# Patient Record
Sex: FEMALE
[De-identification: names, ages, dates, MRNs, and addresses within clinical notes are randomized per-mention and may not be internally consistent; named-entity substitution may affect disease eponyms.]

---

## 2024-04-26 PROBLEM — Z00.00 ENCOUNTER FOR PREVENTIVE HEALTH EXAMINATION: Status: ACTIVE | Noted: 2024-04-26

## 2024-05-02 PROBLEM — M25.812 MASS OF JOINT OF LEFT SHOULDER: Status: ACTIVE | Noted: 2024-05-02

## 2024-05-03 ENCOUNTER — APPOINTMENT (OUTPATIENT)
Dept: ORTHOPEDIC SURGERY | Facility: CLINIC | Age: 54
End: 2024-05-03
Payer: COMMERCIAL

## 2024-05-03 VITALS
SYSTOLIC BLOOD PRESSURE: 113 MMHG | OXYGEN SATURATION: 98 % | HEART RATE: 88 BPM | HEIGHT: 65 IN | DIASTOLIC BLOOD PRESSURE: 75 MMHG | WEIGHT: 200 LBS | BODY MASS INDEX: 33.32 KG/M2

## 2024-05-03 DIAGNOSIS — G54.0 BRACHIAL PLEXUS DISORDERS: ICD-10-CM

## 2024-05-03 DIAGNOSIS — M25.812 OTHER SPECIFIED JOINT DISORDERS, LEFT SHOULDER: ICD-10-CM

## 2024-05-03 PROCEDURE — 99205 OFFICE O/P NEW HI 60 MIN: CPT

## 2024-05-03 PROCEDURE — 73030 X-RAY EXAM OF SHOULDER: CPT | Mod: LT

## 2024-05-06 NOTE — DATA REVIEWED
[Imaging Present] : Present [de-identified] : Xryays today, muiltiple views of the left shoulder Show a soft tissue lucency under the deltoid along the lateral and posterior portion of the proximal humerus.  The rest of the bones look intact.  There is no significant erosion.  MRI scan March 28, 2024 of the left shoulder shows:  IMPRESSION:  MRI of the left shoulder demonstrates: There is a large lipomatous lesion with thin septations identified in the soft tissues lateral to the proximal humerus. This measures 7.5 cm in the craniocaudal dimension by 4.3 cm in transverse dimension by 5.0 cm in AP dimension. Because of its prominent size presence of septations orthopedic oncologic consultation suggested. This could reflect atypical lipoma (favored) but low grade liposarcoma is not excluded.  Partial tear of the posterior superior labrum. Linear fissure in the cartilage of the posterior superior glenoid.  Rotator cuff tendinosis.  Subacromial/subdeltoid bursitis.

## 2024-05-06 NOTE — REVIEW OF SYSTEMS
[Joint Stiffness] : joint stiffness [Joint Swelling] : joint swelling [Feeling Tired] : not feeling tired [Joint Pain] : no joint pain
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
## 2024-05-06 NOTE — PHYSICAL EXAM
[General Appearance - Well-Appearing] : Well appearing [General Appearance - Well Nourished] : well nourished [Oriented To Time, Place, And Person] : Oriented to person, place, and time [Sclera] : the sclera and conjunctiva were normal [Neck Cervical Mass (___cm)] : no neck mass was observed [Heart Rate And Rhythm] : heart rate was normal and rhythm regular [] : No respiratory distress [Abdomen Soft] : Soft [Normal Station and Gait] : gait and station were normal [Swelling] : swelling [Masses] : masses [Full UE ROM unless otherwise noted:] : Full range of motion unless otherwise noted. [UE Motor Strength Normal unless otherwise noted:] : 5/5 strength in bilateral upper extremities unless otherwise noted. [Normal] : Sensation intact to light touch. [FreeTextEntry1] : On exam the patient stands in good balance.  She does have some obvious swelling over the lateral side of her shoulder on the left compared to the right.  She gets up to 170 degrees of abduction on the right and only 150 on the left.  She has good internal and external rotation with minimal pain.  She has normal sensation throughout.  She has good strength laterally In the deltoid.  She has no lymphadenopathy. [Skin Changes - Describe changes:] : No skin changes noted

## 2024-05-06 NOTE — DISCUSSION/SUMMARY
[Surgical risks reviewed] : Surgical risks reviewed [All Questions Answered] : Patient (and family) had all questions answered to an agreeable level of satisfaction [Interested in Proceeding] : Patient (and family) expressed understanding and interest in proceeding with the plan as outlined [de-identified] : Patient has a large lipomatous mass in the left shoulder.  The axillary nerve with posterior humeral circumflex artery right and right over the mass and are being stretched out by the mass laterally.  It also shows a tail of the fatty mass going towards the axilla posteromedially.  Regardless I would do a posterolateral type approach and expose the artery and nerve and try and mobilize the fat around it possibly even bisecting it if necessary.  She understands there is a risk of numbness as well as some weakness in the shoulder.  I have discussed with the patient the spectrum of fat containing lesions ranging  from small simple lipoma is to atypical lipomatous tumors to low-grade malignancy is to full de-differentiated liposarcomas. We discussed how atypical lipomatous tumors and to have a chance of recurrence that is higher than regular lipomas. We discussed that when recurring they also have a small chance of coming back as a malignancy. We discussed that the terminology of low-grade sarcoma vs. atypical lipomatous tumor has to do with the location of the lesion as well as its aggressiveness. Atypical lipomatous tumor is diagnosed mostly based on its genetic characteristics.  Will plan for surgery in the near future.  With her history of seizures she is get any neurologic as well as medical clearance.  We discussed smoking cessation as well.   If imaging or pathology/biopsy was ordered, the patient was told to make an appointment to review findings right after all imaging is completed.  We discussed risks, benefits and alternatives. Rationale of care was reviewed and all questions were answered. Patient (and family) had all questions answered to her degree of the level of satisfaction. Patient (and family) expressed understanding and interest in proceeding with the plan as outlined.     This note was done with a voice recognition transcription software and any typos are related to this rather than medical error. Surgical risks reviewed. Patient (and family) had all questions answered to an agreeable level of satisfaction. Patient (and family) expressed understanding and interest in proceeding with the plan as outlined.

## 2024-05-30 ENCOUNTER — TRANSCRIPTION ENCOUNTER (OUTPATIENT)
Age: 54
End: 2024-05-30

## 2024-05-30 VITALS
HEIGHT: 64 IN | WEIGHT: 203.05 LBS | HEART RATE: 68 BPM | DIASTOLIC BLOOD PRESSURE: 83 MMHG | TEMPERATURE: 98 F | SYSTOLIC BLOOD PRESSURE: 135 MMHG | RESPIRATION RATE: 16 BRPM | OXYGEN SATURATION: 98 %

## 2024-05-30 NOTE — ASU PATIENT PROFILE, ADULT - FALL HARM RISK - RISK INTERVENTIONS

## 2024-05-30 NOTE — ASU PATIENT PROFILE, ADULT - INTERNATIONAL TRAVEL
Daily Note     Today's date: 2019  Patient name: Ailyn Haji  : 1975  MRN: [id number]  Referring provider: Carly Monreal MD  Dx:   Encounter Diagnosis     ICD-10-CM    1  Acute pain of left shoulder M25 512                   Subjective: Patient reports she has most soreness in the arm, more than the shoulder  Objective: See treatment diary below  Patient reaches PROM limitations in all planes except FF  Assessment: Tolerated treatment well  Patient exhibited good technique with therapeutic exercises      Plan: Progress treament per protocol  Precautions: None     Daily Treatment Diary      Manual         PROM                               Exercise Diary                Pendulums 10x TID  20x  20x  20X       Scapular retractions 10x TID  20x  20x  20X       Elbow, forearm, wrist, and hand AROM 10x TID  20x  20x  20X       Pulleys      10x each  10ea       Supine flex PROM with R arm A     10x  10X       Wand ER   20x  20x  20X       Wand Flexion               Wand Abduction               Wand IR               Table Slides               Wall Slides               UBE: S               T-Band High Row               T-Band Mid Row               T-Band Low Row               T-Band Biceps               T-Band Triceps               T-Band SA Pulldowns               T-Band IR               T-Band ER               Forward/Lateral Raises               Hoist Row: S                Lat Pulldown:  S               XO Biceps               XO Triceps               XO Upright Row               XO IR               XO ER               XO SA Pulldown               Blackburns               Ball Stabilization               Bodyblade                               Modalities                CP 13'              CP/IFC    15'  15'  15'        
No

## 2024-05-30 NOTE — ASU PATIENT PROFILE, ADULT - NSICDXPASTSURGICALHX_GEN_ALL_CORE_FT
PAST SURGICAL HISTORY:  H/O: hysterectomy 2000    History of cholecystectomy     History of hernia repair
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
## 2024-05-30 NOTE — ASU PREOP CHECKLIST - SURGICAL CONSENT
LINKS immunization registry, Care Everywhere and Health Maintenance updated.  Chart reviewed for overdue Proactive [facility] Encounters health maintenance testing.   done

## 2024-05-31 ENCOUNTER — OUTPATIENT (OUTPATIENT)
Dept: OUTPATIENT SERVICES | Facility: HOSPITAL | Age: 54
LOS: 1 days | Discharge: HOME | End: 2024-05-31
Payer: COMMERCIAL

## 2024-05-31 ENCOUNTER — RESULT REVIEW (OUTPATIENT)
Age: 54
End: 2024-05-31

## 2024-05-31 ENCOUNTER — TRANSCRIPTION ENCOUNTER (OUTPATIENT)
Age: 54
End: 2024-05-31

## 2024-05-31 ENCOUNTER — APPOINTMENT (OUTPATIENT)
Dept: ORTHOPEDIC SURGERY | Facility: HOSPITAL | Age: 54
End: 2024-05-31

## 2024-05-31 VITALS
RESPIRATION RATE: 20 BRPM | DIASTOLIC BLOOD PRESSURE: 66 MMHG | OXYGEN SATURATION: 94 % | SYSTOLIC BLOOD PRESSURE: 117 MMHG | HEART RATE: 76 BPM

## 2024-05-31 DIAGNOSIS — Z90.49 ACQUIRED ABSENCE OF OTHER SPECIFIED PARTS OF DIGESTIVE TRACT: Chronic | ICD-10-CM

## 2024-05-31 DIAGNOSIS — Z98.890 OTHER SPECIFIED POSTPROCEDURAL STATES: Chronic | ICD-10-CM

## 2024-05-31 DIAGNOSIS — Z90.710 ACQUIRED ABSENCE OF BOTH CERVIX AND UTERUS: Chronic | ICD-10-CM

## 2024-05-31 LAB
GLUCOSE BLDC GLUCOMTR-MCNC: 119 MG/DL — HIGH (ref 70–99)
GLUCOSE BLDC GLUCOMTR-MCNC: 139 MG/DL — HIGH (ref 70–99)

## 2024-05-31 PROCEDURE — 23078 RAD RESCJ TUM SHOULDER 5CM/>: CPT | Mod: LT

## 2024-05-31 PROCEDURE — 82962 GLUCOSE BLOOD TEST: CPT

## 2024-05-31 PROCEDURE — 94640 AIRWAY INHALATION TREATMENT: CPT

## 2024-05-31 PROCEDURE — 88304 TISSUE EXAM BY PATHOLOGIST: CPT

## 2024-05-31 PROCEDURE — C9399: CPT

## 2024-05-31 PROCEDURE — 88304 TISSUE EXAM BY PATHOLOGIST: CPT | Mod: 26

## 2024-05-31 RX ORDER — ACETAMINOPHEN 500 MG
1000 TABLET ORAL EVERY 6 HOURS
Refills: 0 | Status: DISCONTINUED | OUTPATIENT
Start: 2024-05-31 | End: 2024-05-31

## 2024-05-31 RX ORDER — HYDROCORTISONE 20 MG
200 TABLET ORAL ONCE
Refills: 0 | Status: COMPLETED | OUTPATIENT
Start: 2024-05-31 | End: 2024-05-31

## 2024-05-31 RX ORDER — HYDROMORPHONE HYDROCHLORIDE 2 MG/ML
0.5 INJECTION INTRAMUSCULAR; INTRAVENOUS; SUBCUTANEOUS
Refills: 0 | Status: DISCONTINUED | OUTPATIENT
Start: 2024-05-31 | End: 2024-05-31

## 2024-05-31 RX ORDER — OXYCODONE AND ACETAMINOPHEN 5; 325 MG/1; MG/1
1 TABLET ORAL
Qty: 16 | Refills: 0
Start: 2024-05-31 | End: 2024-06-03

## 2024-05-31 RX ORDER — AMLODIPINE BESYLATE 2.5 MG/1
1 TABLET ORAL
Refills: 0 | DISCHARGE

## 2024-05-31 RX ORDER — SENNA PLUS 8.6 MG/1
1 TABLET ORAL
Qty: 9 | Refills: 0
Start: 2024-05-31 | End: 2024-06-08

## 2024-05-31 RX ORDER — OXYCODONE HYDROCHLORIDE 5 MG/1
10 TABLET ORAL EVERY 4 HOURS
Refills: 0 | Status: DISCONTINUED | OUTPATIENT
Start: 2024-05-31 | End: 2024-05-31

## 2024-05-31 RX ORDER — METFORMIN HYDROCHLORIDE 850 MG/1
1 TABLET ORAL
Refills: 0 | DISCHARGE

## 2024-05-31 RX ORDER — IPRATROPIUM/ALBUTEROL SULFATE 18-103MCG
3 AEROSOL WITH ADAPTER (GRAM) INHALATION ONCE
Refills: 0 | Status: COMPLETED | OUTPATIENT
Start: 2024-05-31 | End: 2024-05-31

## 2024-05-31 RX ORDER — SODIUM CHLORIDE 9 MG/ML
1000 INJECTION, SOLUTION INTRAVENOUS
Refills: 0 | Status: DISCONTINUED | OUTPATIENT
Start: 2024-05-31 | End: 2024-05-31

## 2024-05-31 RX ORDER — OXYCODONE HYDROCHLORIDE 5 MG/1
5 TABLET ORAL EVERY 4 HOURS
Refills: 0 | Status: DISCONTINUED | OUTPATIENT
Start: 2024-05-31 | End: 2024-05-31

## 2024-05-31 RX ORDER — CEFAZOLIN SODIUM 1 G
2000 VIAL (EA) INJECTION EVERY 8 HOURS
Refills: 0 | Status: COMPLETED | OUTPATIENT
Start: 2024-05-31 | End: 2024-05-31

## 2024-05-31 RX ORDER — LAMOTRIGINE 25 MG/1
1 TABLET, ORALLY DISINTEGRATING ORAL
Refills: 0 | DISCHARGE

## 2024-05-31 RX ORDER — BUDESONIDE AND FORMOTEROL FUMARATE DIHYDRATE 160; 4.5 UG/1; UG/1
2 AEROSOL RESPIRATORY (INHALATION)
Refills: 0 | DISCHARGE

## 2024-05-31 RX ORDER — ATORVASTATIN CALCIUM 80 MG/1
1 TABLET, FILM COATED ORAL
Refills: 0 | DISCHARGE

## 2024-05-31 RX ORDER — BENZOCAINE AND MENTHOL 5; 1 G/100ML; G/100ML
1 LIQUID ORAL ONCE
Refills: 0 | Status: COMPLETED | OUTPATIENT
Start: 2024-05-31 | End: 2024-05-31

## 2024-05-31 RX ADMIN — Medication 1000 MILLIGRAM(S): at 10:19

## 2024-05-31 RX ADMIN — HYDROMORPHONE HYDROCHLORIDE 0.5 MILLIGRAM(S): 2 INJECTION INTRAMUSCULAR; INTRAVENOUS; SUBCUTANEOUS at 10:10

## 2024-05-31 RX ADMIN — Medication 3 MILLILITER(S): at 12:00

## 2024-05-31 RX ADMIN — Medication 3 MILLILITER(S): at 10:40

## 2024-05-31 RX ADMIN — Medication 200 MILLIGRAM(S): at 11:45

## 2024-05-31 RX ADMIN — HYDROMORPHONE HYDROCHLORIDE 0.5 MILLIGRAM(S): 2 INJECTION INTRAMUSCULAR; INTRAVENOUS; SUBCUTANEOUS at 10:25

## 2024-05-31 RX ADMIN — SODIUM CHLORIDE 100 MILLILITER(S): 9 INJECTION, SOLUTION INTRAVENOUS at 10:12

## 2024-05-31 RX ADMIN — Medication 1000 MILLIGRAM(S): at 10:49

## 2024-05-31 RX ADMIN — BENZOCAINE AND MENTHOL 1 LOZENGE: 5; 1 LIQUID ORAL at 10:40

## 2024-05-31 NOTE — CHART NOTE - NSCHARTNOTEFT_GEN_A_CORE
Notified by RN patient c/o asthma exacerbation.   Seen sitting up in stretcher. Discussed symptoms, states it feels like when she has an asthma exacerbation at home for which she sees a pulmonologist.  Per patient, she took her inhaler this morning as usual. She reports she recently went to pulmonologist who told her to take "steroids and azithromycin".  Chart reviewed, noted she was seen by PMD for asthma exacerbation a few weeks ago. Reports she felt better after course of steroid course.  On exam, respirations appear equal, symmetrical, NAD. Bilateral lung sounds with expiratory wheeze.  Dr. Bowden came to bedside, decided to give duoneb treatment.  Given patient wheeze unchanged after treatment, will give solucortef pe Dr. Bowden.  Patient informed of above by Dr. Bowden, RN aware.  Currently sleeping in chair.  Dispo likely home when PACU criteria met/symptoms of asthma improved.

## 2024-05-31 NOTE — ASU DISCHARGE PLAN (ADULT/PEDIATRIC) - NS MD DC FALL RISK RISK
For information on Fall & Injury Prevention, visit: [email or web address] OR  [email or web address] OR  https://www.cdc.gov/steadi/patient.html

## 2024-05-31 NOTE — ASU DISCHARGE PLAN (ADULT/PEDIATRIC) - ASU DC SPECIAL INSTRUCTIONSFT
Change dressing as needed if saturated, otherwise keep until follow up with Dr. Su Leave dressing for 72 hrs, then may remove and leave open to air. Do not apply any creams, lotions etc.

## 2024-05-31 NOTE — PRE-ANESTHESIA EVALUATION ADULT - NSDENTALSD_ENT_ALL_CORE
[facility]  Department of Pulmonary, Critical Care and Sleep Medicine  [address] [address] [facility]  Department of Internal Medicine  Progress Note    SUBJECTIVE:    On gtt   Still with edema  He is still quite short of breath with exertion. OBJECTIVE:  Vitals:    03/02/22 0941 03/02/22 1246 03/02/22 1336 03/02/22 1630   BP:    101/73   Pulse:    98   Resp:    18   Temp:    97.4 °F (36.3 °C)   TempSrc:    Oral   SpO2: 92% 95%     Weight:       Height:   5' 9\" (1.753 m)      Constitutional: Alert,     EENT: EOMI YOSEF. MMM. No icterus. No thrush. Neck: No thyromegaly. No JVD on my exam.  Respiratory: Breath sounds are diminished without use of accessory muscles. Cardiovascular: Regular,      Pulses:  Equal bilaterally. Abdomen: Soft without organomegaly. Lymphatic: No lymphadenopathy. Musculoskeletal: Without weakness or gross deficits  Extremities: 3+ edema bilaterally. L>>R Feet still quite tight  Skin:  Warm and dry. No skin rashes. Neurological/Psychiatric: No acute psychosis. Cranial nerves are intact. DATA:    Monitor Strips:  Reviewed & discusses with technical team. No changes noted.     RADIOLOGY:  Films were read/reviewed/discussed with radiology shows effusion and heart failure    CBC with Differential:    Lab Results   Component Value Date    WBC 10.7 03/02/2022    RBC 4.03 03/02/2022    HGB 11.5 03/02/2022    HCT 36.2 03/02/2022     03/02/2022    MCV 89.8 03/02/2022    MCH 28.5 03/02/2022    MCHC 31.8 03/02/2022    RDW 16.5 03/02/2022    LYMPHOPCT 19.9 02/23/2022    MONOPCT 11.6 02/23/2022    BASOPCT 0.3 02/23/2022    MONOSABS 1.10 02/23/2022    LYMPHSABS 1.89 02/23/2022    EOSABS 0.30 02/23/2022    BASOSABS 0.03 02/23/2022     CMP:    Lab Results   Component Value Date     03/02/2022    K 3.2 03/02/2022    K 4.1 02/23/2022    CL 93 03/02/2022    CO2 34 03/02/2022    BUN 49 03/02/2022    CREATININE 2.7 03/02/2022    GFRAA appears normal and intact

## 2024-05-31 NOTE — DISCHARGE NOTE NURSING/CASE MANAGEMENT/SOCIAL WORK - NSDCPEFALRISK_GEN_ALL_CORE
For information on Fall & Injury Prevention, visit: [email or web address] OR  [email or web address] OR  https://www.cdc.gov/steadi/patient.html

## 2024-05-31 NOTE — DISCHARGE NOTE NURSING/CASE MANAGEMENT/SOCIAL WORK - PATIENT PORTAL LINK FT
You can access the FollowMyHealth Patient Portal offered by [facility] by registering at the following website: [email or web address]. By joining [facility]’s FollowMyHealth portal, you will also be able to view your health information using other applications (apps) compatible with our system.

## 2024-05-31 NOTE — ASU DISCHARGE PLAN (ADULT/PEDIATRIC) - CARE PROVIDER_API CALL
Rebel Su  Orthopaedic Surgery  [address], [address]  [place] [address]  Phone: [phone]  Fax: [phone]  Established Patient  Follow Up Time: 2 weeks

## 2024-06-03 PROBLEM — E78.5 HYPERLIPIDEMIA, UNSPECIFIED: Chronic | Status: ACTIVE | Noted: 2024-05-30

## 2024-06-03 PROBLEM — E11.9 TYPE 2 DIABETES MELLITUS WITHOUT COMPLICATIONS: Chronic | Status: ACTIVE | Noted: 2024-05-30

## 2024-06-03 PROBLEM — J45.909 UNSPECIFIED ASTHMA, UNCOMPLICATED: Chronic | Status: ACTIVE | Noted: 2024-05-30

## 2024-06-03 PROBLEM — I10 ESSENTIAL (PRIMARY) HYPERTENSION: Chronic | Status: ACTIVE | Noted: 2024-05-30
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
## 2024-06-03 PROBLEM — Z87.898 PERSONAL HISTORY OF OTHER SPECIFIED CONDITIONS: Chronic | Status: ACTIVE | Noted: 2024-05-30

## 2024-06-05 LAB — SURGICAL PATHOLOGY STUDY: SIGNIFICANT CHANGE UP

## 2024-06-14 ENCOUNTER — APPOINTMENT (OUTPATIENT)
Dept: ORTHOPEDIC SURGERY | Facility: CLINIC | Age: 54
End: 2024-06-14
Payer: COMMERCIAL

## 2024-06-14 DIAGNOSIS — D17.22 BENIGN LIPOMATOUS NEOPLASM OF SKIN AND SUBCUTANEOUS TISSUE OF LEFT ARM: ICD-10-CM

## 2024-06-14 PROCEDURE — 99024 POSTOP FOLLOW-UP VISIT: CPT

## 2024-06-14 NOTE — HISTORY OF PRESENT ILLNESS
[___ Weeks Post Op] : [unfilled] weeks post op [2] : the patient reports pain that is 2/10 in severity [Chills] : no chills [Fever] : no fever [Doing Well] : is doing well [Excellent Pain Control] : has excellent pain control [No Sign of Infection] : is showing no signs of infection [de-identified] : 5/31/2024 -resection of left shoulder mass [de-identified] : Patient is doing well this point.  She has no complaints.  She still has some soreness in the deltoid but is able to move everything otherwise. [de-identified] : On exam incision is clean dry and intact.  She has no paresthesias in the area.  She is able to raise her left arm to 140 degrees compared to 170 on the other side but she says it is mostly because of soreness. 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 [de-identified] : 2 weeks postop from a colon resection doing well.  She has no obvious neurologic problems with her axillary nerve.  She is moving appropriately although she has some soreness.  She has no numbness around the area of the shoulder.  My recommendation is to continue following her to make sure she gets back to regular activity otherwise we can send her for physical therapy.  She is confident she can get back to her own activity. [de-identified] : Follow-up again as needed should she have poor motion.  If imaging or pathology/biopsy was ordered, the patient was told to make an appointment to review findings right after all imaging is completed.  We discussed risks, benefits and alternatives. Rationale of care was reviewed and all questions were answered. Patient (and family) had all questions answered to her degree of the level of satisfaction. Patient (and family) expressed understanding and interest in proceeding with the plan as outlined.     This note was done with a voice recognition transcription software and any typos are related to this rather than medical error. Surgical risks reviewed. Patient (and family) had all questions answered to an agreeable level of satisfaction. Patient (and family) expressed understanding and interest in proceeding with the plan as outlined.
